# Patient Record
Sex: FEMALE | Race: ASIAN | NOT HISPANIC OR LATINO | Employment: OTHER | URBAN - METROPOLITAN AREA
[De-identification: names, ages, dates, MRNs, and addresses within clinical notes are randomized per-mention and may not be internally consistent; named-entity substitution may affect disease eponyms.]

---

## 2021-03-12 ENCOUNTER — TRANSFERRED RECORDS (OUTPATIENT)
Dept: HEALTH INFORMATION MANAGEMENT | Facility: CLINIC | Age: 61
End: 2021-03-12

## 2021-03-13 ENCOUNTER — TRANSFERRED RECORDS (OUTPATIENT)
Dept: HEALTH INFORMATION MANAGEMENT | Facility: CLINIC | Age: 61
End: 2021-03-13

## 2022-04-14 ENCOUNTER — TRANSFERRED RECORDS (OUTPATIENT)
Dept: HEALTH INFORMATION MANAGEMENT | Facility: CLINIC | Age: 62
End: 2022-04-14

## 2024-07-24 NOTE — TELEPHONE ENCOUNTER
Action 7/24/24   Fax #[phone]   Action Taken Requested NM stress 4-28-22    Resolved images in PACS 7/25    EKGs from  scanned into Epic

## 2024-07-30 ENCOUNTER — OFFICE VISIT (OUTPATIENT)
Dept: CARDIOLOGY | Facility: CLINIC | Age: 64
End: 2024-07-30
Attending: INTERNAL MEDICINE
Payer: MEDICARE

## 2024-07-30 ENCOUNTER — PRE VISIT (OUTPATIENT)
Dept: CARDIOLOGY | Facility: CLINIC | Age: 64
End: 2024-07-30
Payer: MEDICARE

## 2024-07-30 VITALS
OXYGEN SATURATION: 97 % | SYSTOLIC BLOOD PRESSURE: 133 MMHG | DIASTOLIC BLOOD PRESSURE: 84 MMHG | WEIGHT: 105 LBS | HEART RATE: 53 BPM

## 2024-07-30 DIAGNOSIS — E78.49 OTHER HYPERLIPIDEMIA: ICD-10-CM

## 2024-07-30 DIAGNOSIS — I47.10 SVT (SUPRAVENTRICULAR TACHYCARDIA) (H): Primary | ICD-10-CM

## 2024-07-30 DIAGNOSIS — Z82.49 FAMILY HISTORY OF CORONARY ARTERY DISEASE: ICD-10-CM

## 2024-07-30 PROCEDURE — 93010 ELECTROCARDIOGRAM REPORT: CPT | Mod: GC | Performed by: INTERNAL MEDICINE

## 2024-07-30 PROCEDURE — 93005 ELECTROCARDIOGRAM TRACING: CPT | Performed by: INTERNAL MEDICINE

## 2024-07-30 PROCEDURE — G0463 HOSPITAL OUTPT CLINIC VISIT: HCPCS | Performed by: INTERNAL MEDICINE

## 2024-07-30 PROCEDURE — 99205 OFFICE O/P NEW HI 60 MIN: CPT | Performed by: INTERNAL MEDICINE

## 2024-07-30 RX ORDER — ASPIRIN 81 MG/1
81 TABLET ORAL DAILY
COMMUNITY

## 2024-07-30 RX ORDER — AMOXICILLIN 500 MG
1200 CAPSULE ORAL DAILY
COMMUNITY

## 2024-07-30 RX ORDER — ATORVASTATIN CALCIUM 20 MG/1
20 TABLET, FILM COATED ORAL DAILY
COMMUNITY
Start: 2024-07-08

## 2024-07-30 ASSESSMENT — PAIN SCALES - GENERAL: PAINLEVEL: NO PAIN (0)

## 2024-07-30 NOTE — NURSING NOTE
Chief Complaint   Patient presents with    New Patient     New Cardiology     Vitals were taken, medications reconciled, and EKG was performed.    Feliz De La Rosa, ANDIE  12:08 PM

## 2024-07-30 NOTE — PROGRESS NOTES
"HPI:   Alina Karimi is a 64-year-old woman with past history of paroxysmal supraventricular tachycardia who underwent apparently successful ablation of that arrhythmia in 2022.  She is here today in the clinic with her daughter due to recent death of brother due to coronary artery disease.  Patient's father also has a history of coronary artery disease and passed away 7 years ago.  Her mother similarly passed away a number of years ago but for other reasons.    Patient is concerned about her risk of coronary artery disease given the family history and she is here for that purpose.  She has no cardiac symptoms.  Her daughter acted as an .  Patient denies any excessive breathlessness, chest heaviness or tightness, palpitations or heart failure related symptoms.    Alina is a non-smoker and does not consume much in the way of alcohol.    Alina does have complaints of \"night sweats\" of uncertain etiology.  She was worried that these may be indicative of cardiac issues since she had similar problems prior to her ablation in 2022.  The night sweats are unassociated with any typical cardiac complaints.  The etiology is currently unknown.    Based on presumably a evaluation of her lipid profile in the past she is currently being treated with atorvastatin and is also taking aspirin daily.    At today's visit I reviewed her review cardiac symptom status in detail and there were no positive findings.  Nonetheless given her concern we will arrange for coronary artery disease screening and follow-up by video visit.    PAST MEDICAL HISTORY:  No past medical history on file.    CURRENT MEDICATIONS:  Current Outpatient Medications   Medication Sig Dispense Refill    aspirin 81 MG EC tablet Take 81 mg by mouth daily      atorvastatin (LIPITOR) 20 MG tablet Take 20 mg by mouth daily      Omega-3 Fatty Acids (FISH OIL) 1200 MG capsule Take 1,200 mg by mouth daily         PAST SURGICAL HISTORY:  No past surgical history on " "file.    ALLERGIES:   No Known Allergies    FAMILY HISTORY:  Pertinent family history is as indicated in HPI    SOCIAL HISTORY:  Social History     Tobacco Use    Smoking status: Never    Smokeless tobacco: Never       ROS:   Constitutional: No fever, chills, or sweats. Weight stable.   ENT: No visual disturbance, ear ache, epistaxis, sore throat.   Cardiovascular: As per HPI.   Respiratory: No cough, hemoptysis.    GI: No nausea, vomiting,   : No hematuria.   Integument: Negative.   Psychiatric: Negative.   Hematologic: , no easy bleeding.  Neuro: Negative.   Endocrinology: No significant heat or cold intolerance   Musculoskeletal: No myalgia.    Exam:  /84 (BP Location: Right arm, Patient Position: Chair, Cuff Size: Adult Regular)   Pulse 53   Wt 47.6 kg (105 lb)   SpO2 97%   GENERAL APPEARANCE: healthy, alert and no distress  HEENT: no icterus, no xanthelasmas, normal pupil size , normal palate, mucosa moist, no central cyanosis  NECK: no adenopathy, no asymmetry, masses, or scars,   RESPIRATORY: lungs clear to auscultation - no rales, rhonchi or wheezes, no use of accessory muscles, no retractions, respirations are unlabored, normal respiratory rate  CARDIOVASCULAR: regular rhythm, normal S1 with physiologic split S2, no S3 or S4 and no murmur, click or rub, precordium quiet with normal PMI.  ABDOMEN: soft, non tender, bowel sounds normal, aorta not enlarged by palpation, no abdominal bruits  EXTREMITIES: peripheral pulses normal, no edema, no bruits  NEURO: alert and oriented to person/place/time, normal speech,and affect  SKIN: no ecchymoses, no rashes    Labs:  CBC RESULTS:   No results found for: \"WBC\", \"RBC\", \"HGB\", \"HCT\", \"MCV\", \"MCH\", \"MCHC\", \"RDW\", \"PLT\"    BMP RESULTS:  No results found for: \"NA\", \"POTASSIUM\", \"CHLORIDE\", \"CO2\", \"ANIONGAP\", \"GLC\", \"BUN\", \"CR\", \"GFRESTIMATED\", \"GFRESTBLACK\", \"ABDOULAYE\"     INR RESULTS:  No results found for: \"INR\"    Procedures:  PULMONARY FUNCTION TESTS:        No " data to display                  ECHOCARDIOGRAM:   No results found for this or any previous visit (from the past 8760 hour(s)).      Assessment and Plan:   1.  Patient concerned about risk of coronary artery disease given strong family history  2.  Night sweats of uncertain etiology    Plan  1.  Lexiscan  2.  Echocardiogram   3.   Laboratory studies including: ESR, CRP, LUCIANA, BNP, BMP, lipid profile, thyroid indices  4.  Video follow-up after the above laboratory values are available for discussion with patient and daughter    Total elapsed time today with chart review, clinic visit and documentation 60 minutes    I very much appreciated the opportunity to see and assess Alina Karimi in the clinic       Howard Esquivel MD  Cardiac Arrhythmia Service  [facility]  [phone]       CC  Pa Jef Gonzalez MD

## 2024-07-30 NOTE — LETTER
"7/30/2024      RE: Alina Karimi  [address] [place] [address]       Dear Colleague,    Thank you for the opportunity to participate in the care of your patient, Alina Karimi, at the [facility] [place] [facility]. Please see a copy of my visit note below.    HPI:   Alina Karimi is a 64-year-old woman with past history of paroxysmal supraventricular tachycardia who underwent apparently successful ablation of that arrhythmia in 2022.  She is here today in the clinic with her daughter due to recent death of brother due to coronary artery disease.  Patient's father also has a history of coronary artery disease and passed away 7 years ago.  Her mother similarly passed away a number of years ago but for other reasons.    Patient is concerned about her risk of coronary artery disease given the family history and she is here for that purpose.  She has no cardiac symptoms.  Her daughter acted as an .  Patient denies any excessive breathlessness, chest heaviness or tightness, palpitations or heart failure related symptoms.    Alina is a non-smoker and does not consume much in the way of alcohol.    Alina does have complaints of \"night sweats\" of uncertain etiology.  She was worried that these may be indicative of cardiac issues since she had similar problems prior to her ablation in 2022.  The night sweats are unassociated with any typical cardiac complaints.  The etiology is currently unknown.    Based on presumably a evaluation of her lipid profile in the past she is currently being treated with atorvastatin and is also taking aspirin daily.    At today's visit I reviewed her review cardiac symptom status in detail and there were no positive findings.  Nonetheless given her concern we will arrange for coronary artery disease screening and follow-up by video visit.    PAST MEDICAL HISTORY:  No past medical history on file.    CURRENT " MEDICATIONS:  Current Outpatient Medications   Medication Sig Dispense Refill     aspirin 81 MG EC tablet Take 81 mg by mouth daily       atorvastatin (LIPITOR) 20 MG tablet Take 20 mg by mouth daily       Omega-3 Fatty Acids (FISH OIL) 1200 MG capsule Take 1,200 mg by mouth daily         PAST SURGICAL HISTORY:  No past surgical history on file.    ALLERGIES:   No Known Allergies    FAMILY HISTORY:  Pertinent family history is as indicated in HPI    SOCIAL HISTORY:  Social History     Tobacco Use     Smoking status: Never     Smokeless tobacco: Never       ROS:   Constitutional: No fever, chills, or sweats. Weight stable.   ENT: No visual disturbance, ear ache, epistaxis, sore throat.   Cardiovascular: As per HPI.   Respiratory: No cough, hemoptysis.    GI: No nausea, vomiting,   : No hematuria.   Integument: Negative.   Psychiatric: Negative.   Hematologic: , no easy bleeding.  Neuro: Negative.   Endocrinology: No significant heat or cold intolerance   Musculoskeletal: No myalgia.    Exam:  /84 (BP Location: Right arm, Patient Position: Chair, Cuff Size: Adult Regular)   Pulse 53   Wt 47.6 kg (105 lb)   SpO2 97%   GENERAL APPEARANCE: healthy, alert and no distress  HEENT: no icterus, no xanthelasmas, normal pupil size , normal palate, mucosa moist, no central cyanosis  NECK: no adenopathy, no asymmetry, masses, or scars,   RESPIRATORY: lungs clear to auscultation - no rales, rhonchi or wheezes, no use of accessory muscles, no retractions, respirations are unlabored, normal respiratory rate  CARDIOVASCULAR: regular rhythm, normal S1 with physiologic split S2, no S3 or S4 and no murmur, click or rub, precordium quiet with normal PMI.  ABDOMEN: soft, non tender, bowel sounds normal, aorta not enlarged by palpation, no abdominal bruits  EXTREMITIES: peripheral pulses normal, no edema, no bruits  NEURO: alert and oriented to person/place/time, normal speech,and affect  SKIN: no ecchymoses, no  "rashes    Labs:  CBC RESULTS:   No results found for: \"WBC\", \"RBC\", \"HGB\", \"HCT\", \"MCV\", \"MCH\", \"MCHC\", \"RDW\", \"PLT\"    BMP RESULTS:  No results found for: \"NA\", \"POTASSIUM\", \"CHLORIDE\", \"CO2\", \"ANIONGAP\", \"GLC\", \"BUN\", \"CR\", \"GFRESTIMATED\", \"GFRESTBLACK\", \"ABDOULAYE\"     INR RESULTS:  No results found for: \"INR\"    Procedures:  PULMONARY FUNCTION TESTS:        No data to display                  ECHOCARDIOGRAM:   No results found for this or any previous visit (from the past 8760 hour(s)).      Assessment and Plan:   1.  Patient concerned about risk of coronary artery disease given strong family history  2.  Night sweats of uncertain etiology    Plan  1.  Lexiscan  2.  Echocardiogram   3.   Laboratory studies including: ESR, CRP, LUCIANA, BNP, BMP, lipid profile, thyroid indices  4.  Video follow-up after the above laboratory values are available for discussion with patient and daughter    Total elapsed time today with chart review, clinic visit and documentation 60 minutes    I very much appreciated the opportunity to see and assess Alina Karimi in the clinic       Howard Esquivel MD  Cardiac Arrhythmia Service  [facility]  [phone]       CC  Pa Jef Gonzalez MD      Please do not hesitate to contact me if you have any questions/concerns.     Sincerely,     Howard Esquivel MD  "

## 2024-07-30 NOTE — PATIENT INSTRUCTIONS
You were seen in the Electrophysiology Clinic today by: Dr Esquivel    Plan:     Labs/Tests Needed:  Echocardiogram  Lexiscan  Labs    Follow up Visit:  1-2 months or after testing         If you have further questions, please utilize [employer] to contact us.     Your Care Team:    EP Cardiology   Telephone Number     Nurse Line  Talita Gamble, RN   Seda Mcgregor, RN  Jimenez Crockett, LAISHA   [phone]     For scheduling appointments:   Sharonda   [phone]   For procedure scheduling:    Ivanna Wilson     [phone]   For the Device Clinic (Pacemakers, ICDs, Loop Recorders)    During business hours: [phone]  After business hours:   [phone]- select option 4 and ask for job code [id number].       On-call cardiologist for after hours or on weekends:   [phone], option #4, and ask to speak to the on-call cardiologist.     Cardiovascular Clinic:   [address] [place] [address]. [place] [address]      As always, Thank you for trusting us with your health care needs!

## 2024-08-01 LAB
ATRIAL RATE - MUSE: 57 BPM
DIASTOLIC BLOOD PRESSURE - MUSE: NORMAL MMHG
INTERPRETATION ECG - MUSE: NORMAL
P AXIS - MUSE: 46 DEGREES
PR INTERVAL - MUSE: 158 MS
QRS DURATION - MUSE: 90 MS
QT - MUSE: 454 MS
QTC - MUSE: 441 MS
R AXIS - MUSE: 68 DEGREES
SYSTOLIC BLOOD PRESSURE - MUSE: NORMAL MMHG
T AXIS - MUSE: 38 DEGREES
VENTRICULAR RATE- MUSE: 57 BPM

## 2024-08-07 ENCOUNTER — HOSPITAL ENCOUNTER (OUTPATIENT)
Dept: NUCLEAR MEDICINE | Facility: CLINIC | Age: 64
Setting detail: NUCLEAR MEDICINE
Discharge: HOME | End: 2024-08-07
Attending: INTERNAL MEDICINE
Payer: MEDICARE

## 2024-08-07 ENCOUNTER — HOSPITAL ENCOUNTER (OUTPATIENT)
Dept: CARDIOLOGY | Facility: CLINIC | Age: 64
Discharge: HOME | End: 2024-08-07
Attending: INTERNAL MEDICINE
Payer: MEDICARE

## 2024-08-07 ENCOUNTER — APPOINTMENT (OUTPATIENT)
Dept: LAB | Facility: CLINIC | Age: 64
End: 2024-08-07
Attending: INTERNAL MEDICINE
Payer: MEDICARE

## 2024-08-07 DIAGNOSIS — Z82.49 FAMILY HISTORY OF CORONARY ARTERY DISEASE: ICD-10-CM

## 2024-08-07 DIAGNOSIS — I47.10 SVT (SUPRAVENTRICULAR TACHYCARDIA) (H): ICD-10-CM

## 2024-08-07 DIAGNOSIS — E78.49 OTHER HYPERLIPIDEMIA: ICD-10-CM

## 2024-08-07 LAB
ANION GAP SERPL CALCULATED.3IONS-SCNC: 8 MMOL/L (ref 7–15)
BUN SERPL-MCNC: 16 MG/DL (ref 8–23)
CALCIUM SERPL-MCNC: 8.8 MG/DL (ref 8.8–10.4)
CHLORIDE SERPL-SCNC: 108 MMOL/L (ref 98–107)
CHOLEST SERPL-MCNC: 193 MG/DL
CREAT SERPL-MCNC: 0.53 MG/DL (ref 0.51–0.95)
CRP SERPL-MCNC: <3 MG/L
CV STRESS MAX HR HE: 85
EGFRCR SERPLBLD CKD-EPI 2021: >90 ML/MIN/1.73M2
ERYTHROCYTE [DISTWIDTH] IN BLOOD BY AUTOMATED COUNT: 13.1 % (ref 10–15)
ERYTHROCYTE [SEDIMENTATION RATE] IN BLOOD BY WESTERGREN METHOD: 6 MM/HR (ref 0–30)
FASTING STATUS PATIENT QL REPORTED: YES
FASTING STATUS PATIENT QL REPORTED: YES
GLUCOSE SERPL-MCNC: 100 MG/DL (ref 70–99)
HCO3 SERPL-SCNC: 25 MMOL/L (ref 22–29)
HCT VFR BLD AUTO: 39.2 % (ref 35–47)
HDLC SERPL-MCNC: 51 MG/DL
HGB BLD-MCNC: 13 G/DL (ref 11.7–15.7)
LDLC SERPL CALC-MCNC: 122 MG/DL
LVEF ECHO: NORMAL
MCH RBC QN AUTO: 31.3 PG (ref 26.5–33)
MCHC RBC AUTO-ENTMCNC: 33.2 G/DL (ref 31.5–36.5)
MCV RBC AUTO: 94 FL (ref 78–100)
NONHDLC SERPL-MCNC: 142 MG/DL
PLATELET # BLD AUTO: 181 10E3/UL (ref 150–450)
POTASSIUM SERPL-SCNC: 3.9 MMOL/L (ref 3.4–5.3)
RATE PRESSURE PRODUCT: 8160
RBC # BLD AUTO: 4.16 10E6/UL (ref 3.8–5.2)
SODIUM SERPL-SCNC: 141 MMOL/L (ref 135–145)
STRESS ECHO BASELINE DIASTOLIC HE: 65
STRESS ECHO BASELINE HR: 54 BPM
STRESS ECHO BASELINE SYSTOLIC BP: 119
STRESS ECHO CALCULATED PERCENT HR: 55 %
STRESS ECHO LAST STRESS DIASTOLIC BP: 63
STRESS ECHO LAST STRESS SYSTOLIC BP: 96
STRESS ECHO TARGET HR: 155
TRIGL SERPL-MCNC: 102 MG/DL
TSH SERPL DL<=0.005 MIU/L-ACNC: 0.96 UIU/ML (ref 0.3–4.2)
WBC # BLD AUTO: 5 10E3/UL (ref 4–11)

## 2024-08-07 PROCEDURE — 93017 CV STRESS TEST TRACING ONLY: CPT

## 2024-08-07 PROCEDURE — 84443 ASSAY THYROID STIM HORMONE: CPT | Performed by: INTERNAL MEDICINE

## 2024-08-07 PROCEDURE — G1010 CDSM STANSON: HCPCS | Performed by: INTERNAL MEDICINE

## 2024-08-07 PROCEDURE — 80061 LIPID PANEL: CPT | Performed by: INTERNAL MEDICINE

## 2024-08-07 PROCEDURE — 255N000002 HC RX 255 OP 636: Performed by: INTERNAL MEDICINE

## 2024-08-07 PROCEDURE — 93016 CV STRESS TEST SUPVJ ONLY: CPT | Performed by: INTERNAL MEDICINE

## 2024-08-07 PROCEDURE — 86140 C-REACTIVE PROTEIN: CPT | Performed by: INTERNAL MEDICINE

## 2024-08-07 PROCEDURE — 93306 TTE W/DOPPLER COMPLETE: CPT | Mod: 26 | Performed by: INTERNAL MEDICINE

## 2024-08-07 PROCEDURE — 85027 COMPLETE CBC AUTOMATED: CPT | Performed by: INTERNAL MEDICINE

## 2024-08-07 PROCEDURE — C8929 TTE W OR WO FOL WCON,DOPPLER: HCPCS

## 2024-08-07 PROCEDURE — 80048 BASIC METABOLIC PNL TOTAL CA: CPT | Performed by: INTERNAL MEDICINE

## 2024-08-07 PROCEDURE — 78452 HT MUSCLE IMAGE SPECT MULT: CPT | Mod: ME

## 2024-08-07 PROCEDURE — 85652 RBC SED RATE AUTOMATED: CPT | Performed by: INTERNAL MEDICINE

## 2024-08-07 PROCEDURE — A9502 TC99M TETROFOSMIN: HCPCS | Performed by: INTERNAL MEDICINE

## 2024-08-07 PROCEDURE — 36415 COLL VENOUS BLD VENIPUNCTURE: CPT | Performed by: INTERNAL MEDICINE

## 2024-08-07 PROCEDURE — 999N000208 ECHOCARDIOGRAM COMPLETE

## 2024-08-07 PROCEDURE — 250N000011 HC RX IP 250 OP 636: Performed by: INTERNAL MEDICINE

## 2024-08-07 PROCEDURE — 343N000001 HC RX 343: Performed by: INTERNAL MEDICINE

## 2024-08-07 PROCEDURE — 83520 IMMUNOASSAY QUANT NOS NONAB: CPT | Performed by: INTERNAL MEDICINE

## 2024-08-07 PROCEDURE — 86038 ANTINUCLEAR ANTIBODIES: CPT | Performed by: INTERNAL MEDICINE

## 2024-08-07 PROCEDURE — 78452 HT MUSCLE IMAGE SPECT MULT: CPT | Mod: 26 | Performed by: INTERNAL MEDICINE

## 2024-08-07 PROCEDURE — 93018 CV STRESS TEST I&R ONLY: CPT | Performed by: INTERNAL MEDICINE

## 2024-08-07 RX ORDER — REGADENOSON 0.08 MG/ML
0.4 INJECTION, SOLUTION INTRAVENOUS ONCE
Status: COMPLETED | OUTPATIENT
Start: 2024-08-07 | End: 2024-08-07

## 2024-08-07 RX ORDER — AMINOPHYLLINE 25 MG/ML
50-100 INJECTION, SOLUTION INTRAVENOUS
Status: DISCONTINUED | OUTPATIENT
Start: 2024-08-07 | End: 2024-08-08 | Stop reason: HOSPADM

## 2024-08-07 RX ORDER — LIDOCAINE 40 MG/G
CREAM TOPICAL
Status: DISCONTINUED | OUTPATIENT
Start: 2024-08-07 | End: 2024-08-08 | Stop reason: HOSPADM

## 2024-08-07 RX ORDER — ALBUTEROL SULFATE 90 UG/1
2 AEROSOL, METERED RESPIRATORY (INHALATION) EVERY 5 MIN PRN
Status: DISCONTINUED | OUTPATIENT
Start: 2024-08-07 | End: 2024-08-08 | Stop reason: HOSPADM

## 2024-08-07 RX ADMIN — TETROFOSMIN 10.6 MILLICURIE: 1.38 INJECTION, POWDER, LYOPHILIZED, FOR SOLUTION INTRAVENOUS at 09:45

## 2024-08-07 RX ADMIN — REGADENOSON 0.4 MG: 0.08 INJECTION, SOLUTION INTRAVENOUS at 10:55

## 2024-08-07 RX ADMIN — TETROFOSMIN 40.4 MILLICURIE: 1.38 INJECTION, POWDER, LYOPHILIZED, FOR SOLUTION INTRAVENOUS at 10:57

## 2024-08-07 RX ADMIN — HUMAN ALBUMIN MICROSPHERES AND PERFLUTREN 5 ML: 10; .22 INJECTION, SOLUTION INTRAVENOUS at 13:25

## 2024-08-07 NOTE — PROGRESS NOTES
Pt here for Lexiscan nuclear stress test. Medication and side effects reviewed with patient. Lung sounds clear to auscultation bilaterally. Denied caffeine use. Patient tolerated Lexiscan dose without any adverse reactions. VSS. Monitored post injection and then taken to the [facility] waiting room and instructed to wait there for nuclear medicine tech for follow up imaging.

## 2024-08-08 LAB — ANA SER QL IF: NEGATIVE

## 2024-08-09 LAB — TRYPTASE SERPL-MCNC: 4 UG/L

## 2024-09-01 ENCOUNTER — HEALTH MAINTENANCE LETTER (OUTPATIENT)
Age: 64
End: 2024-09-01

## 2024-10-09 ENCOUNTER — OFFICE VISIT (OUTPATIENT)
Dept: CARDIOLOGY | Facility: CLINIC | Age: 64
End: 2024-10-09
Attending: NURSE PRACTITIONER
Payer: MEDICARE

## 2024-10-09 VITALS
DIASTOLIC BLOOD PRESSURE: 73 MMHG | OXYGEN SATURATION: 97 % | WEIGHT: 107 LBS | HEART RATE: 60 BPM | SYSTOLIC BLOOD PRESSURE: 120 MMHG

## 2024-10-09 DIAGNOSIS — Z82.49 FAMILY HISTORY OF CORONARY ARTERY DISEASE: ICD-10-CM

## 2024-10-09 DIAGNOSIS — E78.49 OTHER HYPERLIPIDEMIA: ICD-10-CM

## 2024-10-09 DIAGNOSIS — I47.10 SVT (SUPRAVENTRICULAR TACHYCARDIA) (H): ICD-10-CM

## 2024-10-09 DIAGNOSIS — I77.810 ASCENDING AORTA DILATION (H): Primary | ICD-10-CM

## 2024-10-09 PROCEDURE — G0463 HOSPITAL OUTPT CLINIC VISIT: HCPCS | Performed by: NURSE PRACTITIONER

## 2024-10-09 PROCEDURE — 99213 OFFICE O/P EST LOW 20 MIN: CPT | Performed by: NURSE PRACTITIONER

## 2024-10-09 RX ORDER — ISONIAZID 300 MG/1
TABLET ORAL
COMMUNITY
Start: 2024-09-13

## 2024-10-09 RX ORDER — RIFAMPIN 300 MG/1
CAPSULE ORAL
COMMUNITY
Start: 2024-09-13

## 2024-10-09 ASSESSMENT — PAIN SCALES - GENERAL: PAINLEVEL: NO PAIN (0)

## 2024-10-09 NOTE — NURSING NOTE
Chief Complaint   Patient presents with    Follow Up     2mo follow-up to review results from echo, isac, labs that Dr Esquivel ordered r/t patient concerns about CAD with strong fam hx. Hx SVT.       Vitals were taken, medications reconciled.     Glen Colvin, Clinic Assistant    3:11 PM

## 2024-10-09 NOTE — PROGRESS NOTES
"    ELECTROPHYSIOLOGY CLINIC VISIT    Assessment/Recommendations   Assessment/Plan:    Ms. Karimi is a 64 year old female who has a last medical history significant for PSVT s/p ablation 2022.    We reviewed her recent cardiac testing today. This showed normal heart function and dilated aorta. We will get a CTA for aorta eval given echo reported dilated. NM stress test is negative for ischemia or infarction. ECG is stable. She mentions that she is very worried about her family history and we offered that she could be referred to [facility] for further surveillance.     Follow up with EP on an as needed basis.        History of Present Illness/Subjective    Ms. Alina Karimi is a 64 year old female who comes in today for EP follow-up of cardiac result review.    Ms. Karimi is a 64 year old female who has a last medical history significant for PSVT s/p ablation 2022.    EP Visit 7/30/24: She is here today in the clinic with her daughter due to recent death of brother due to coronary artery disease.  Patient's father also has a history of coronary artery disease and passed away 7 years ago.  Her mother similarly passed away a number of years ago but for other reasons. Patient is concerned about her risk of coronary artery disease given the family history and she is here for that purpose.  She has no cardiac symptoms.  Her daughter acted as an .  Patient denies any excessive breathlessness, chest heaviness or tightness, palpitations or heart failure related symptoms. Alina is a non-smoker and does not consume much in the way of alcohol. Alina does have complaints of \"night sweats\" of uncertain etiology.  She was worried that these may be indicative of cardiac issues since she had similar problems prior to her ablation in 2022.  The night sweats are unassociated with any typical cardiac complaints.  The etiology is currently unknown. Based on presumably a evaluation of her lipid profile in the past " she is currently being treated with atorvastatin and is also taking aspirin daily. At today's visit I reviewed her review cardiac symptom status in detail and there were no positive findings.  Nonetheless given her concern we will arrange for coronary artery disease screening and follow-up by video visit.       She presents today for follow up. She reports feeling well. She denies chest discomfort, palpitations, abdominal fullness/bloating or peripheral edema, shortness of breath, paroxysmal nocturnal dyspnea, orthopnea, lightheadedness, dizziness, pre-syncope, or syncope. An echo from 8/7/24 showed LVEF 60-65%, normal RV function, mild AI, and dilated aorta 4.1 cm (severely dilated d/t BSA). A NM stress test from 8/7/24 showed no ischemia or infarction and LVEF hyperdynamic. Current cardiac medications include: Lipitor and ASA.       I have reviewed and updated the patient's Past Medical History, Social History, Family History and Medication List.     Cardiographics (Personally Reviewed) :   8/7/24 Echo:   Interpretation Summary  Severely dilated ascending aorta when indexed to patient's body surface area  measuring 4.1 cm (3.1 cm/m2.) Recommend CTA or MRA of the aorta to better  assess aortic dimensions.     Left ventricular function is normal.The ejection fraction is 60-65%.  Global right ventricular function is normal.  Mild aortic insufficiency is present.  The right ventricular systolic pressure is 15mmHg above the right atrial  pressure.  There is no prior study for direct comparison.    8/7/24 NM Stress Test:   Result Text       The nuclear stress test is negative for inducible myocardial ischemia or infarction.    Hyperdynamic LV function with LV EF over 75%.    There is no prior study for comparison.         Physical Examination   /73 (BP Location: Right arm, Patient Position: Chair, Cuff Size: Adult Small)   Pulse 60   Wt 48.5 kg (107 lb)   SpO2 97%   Wt Readings from Last 3 Encounters:    10/09/24 48.5 kg (107 lb)   07/30/24 47.6 kg (105 lb)     General Appearance:   Alert, well-appearing and in no acute distress.   HEENT: Atraumatic, normocephalic. PERRL.  MMM.   Chest/Lungs:   Respirations unlabored.  Lungs are clear to auscultation.   Cardiovascular:   Regular rate and rhythm.  S1/S2. No murmur.    Abdomen:  Soft, nontender, nondistended.   Extremities: No cyanosis or clubbing. No edema.    Musculoskeletal: Moves all extremities.  Gait normal.   Skin: Warm, dry, intact.    Neurologic: Mood and affect are appropriate.  Alert and oriented to person, place, time, and situation.          Medications  Allergies   Current Outpatient Medications   Medication Sig Dispense Refill    aspirin 81 MG EC tablet Take 81 mg by mouth daily      atorvastatin (LIPITOR) 20 MG tablet Take 20 mg by mouth daily      isoniazid (NYDRAZID) 300 MG tablet TAKE ONE Tablet (300mg) BY MOUTH EVERY DAY      Omega-3 Fatty Acids (FISH OIL) 1200 MG capsule Take 1,200 mg by mouth daily      rifampin (RIFADIN) 300 MG capsule TAKE TWO CAPSULES (600mg) BY MOUTH EVERY DAY      No Known Allergies      Lab Results (Personally Reviewed)    Chemistry/lipid CBC Cardiac Enzymes/BNP/TSH/INR   Lab Results   Component Value Date    BUN 16.0 08/07/2024     08/07/2024    CO2 25 08/07/2024     Creatinine   Date Value Ref Range Status   08/07/2024 0.53 0.51 - 0.95 mg/dL Final       Lab Results   Component Value Date    CHOL 193 08/07/2024    HDL 51 08/07/2024     (H) 08/07/2024      Lab Results   Component Value Date    WBC 5.0 08/07/2024    HGB 13.0 08/07/2024    HCT 39.2 08/07/2024    MCV 94 08/07/2024     08/07/2024    Lab Results   Component Value Date    TSH 0.96 08/07/2024        The patient states understanding and is agreeable with the plan.   JOSE ALEJANDRO Keating CNP  Electrophysiology Consult Service  Securely message with [employer]   Text page via [facility] Paging/[employer]

## 2024-10-09 NOTE — LETTER
10/9/2024      RE: Alina Karimi  [address] [place] [address]       Dear Colleague,    Thank you for the opportunity to participate in the care of your patient, Alina Karimi, at the [facility] [place] [facility]. Please see a copy of my visit note below.        ELECTROPHYSIOLOGY CLINIC VISIT    Assessment/Recommendations   Assessment/Plan:    Ms. Karimi is a 64 year old female who has a last medical history significant for PSVT s/p ablation 2022.    We reviewed her recent cardiac testing today. This showed normal heart function and dilated aorta. We will get a CTA for aorta eval given echo reported dilated. NM stress test is negative for ischemia or infarction. ECG is stable. She mentions that she is very worried about her family history and we offered that she could be referred to [facility] for further surveillance.     Follow up with EP on an as needed basis.        History of Present Illness/Subjective    Ms. Alina Karimi is a 64 year old female who comes in today for EP follow-up of cardiac result review.    Ms. Karimi is a 64 year old female who has a last medical history significant for PSVT s/p ablation 2022.    EP Visit 7/30/24: She is here today in the clinic with her daughter due to recent death of brother due to coronary artery disease.  Patient's father also has a history of coronary artery disease and passed away 7 years ago.  Her mother similarly passed away a number of years ago but for other reasons. Patient is concerned about her risk of coronary artery disease given the family history and she is here for that purpose.  She has no cardiac symptoms.  Her daughter acted as an .  Patient denies any excessive breathlessness, chest heaviness or tightness, palpitations or heart failure related symptoms. Alina is a non-smoker and does not consume much in the way of alcohol. Alina does have complaints of  "\"night sweats\" of uncertain etiology.  She was worried that these may be indicative of cardiac issues since she had similar problems prior to her ablation in 2022.  The night sweats are unassociated with any typical cardiac complaints.  The etiology is currently unknown. Based on presumably a evaluation of her lipid profile in the past she is currently being treated with atorvastatin and is also taking aspirin daily. At today's visit I reviewed her review cardiac symptom status in detail and there were no positive findings.  Nonetheless given her concern we will arrange for coronary artery disease screening and follow-up by video visit.       She presents today for follow up. She reports feeling well. She denies chest discomfort, palpitations, abdominal fullness/bloating or peripheral edema, shortness of breath, paroxysmal nocturnal dyspnea, orthopnea, lightheadedness, dizziness, pre-syncope, or syncope. An echo from 8/7/24 showed LVEF 60-65%, normal RV function, mild AI, and dilated aorta 4.1 cm (severely dilated d/t BSA). A NM stress test from 8/7/24 showed no ischemia or infarction and LVEF hyperdynamic. Current cardiac medications include: Lipitor and ASA.       I have reviewed and updated the patient's Past Medical History, Social History, Family History and Medication List.     Cardiographics (Personally Reviewed) :   8/7/24 Echo:   Interpretation Summary  Severely dilated ascending aorta when indexed to patient's body surface area  measuring 4.1 cm (3.1 cm/m2.) Recommend CTA or MRA of the aorta to better  assess aortic dimensions.     Left ventricular function is normal.The ejection fraction is 60-65%.  Global right ventricular function is normal.  Mild aortic insufficiency is present.  The right ventricular systolic pressure is 15mmHg above the right atrial  pressure.  There is no prior study for direct comparison.    8/7/24 NM Stress Test:   Result Text        The nuclear stress test is negative for " inducible myocardial ischemia or infarction.     Hyperdynamic LV function with LV EF over 75%.     There is no prior study for comparison.         Physical Examination   /73 (BP Location: Right arm, Patient Position: Chair, Cuff Size: Adult Small)   Pulse 60   Wt 48.5 kg (107 lb)   SpO2 97%   Wt Readings from Last 3 Encounters:   10/09/24 48.5 kg (107 lb)   07/30/24 47.6 kg (105 lb)     General Appearance:   Alert, well-appearing and in no acute distress.   HEENT: Atraumatic, normocephalic. PERRL.  MMM.   Chest/Lungs:   Respirations unlabored.  Lungs are clear to auscultation.   Cardiovascular:   Regular rate and rhythm.  S1/S2. No murmur.    Abdomen:  Soft, nontender, nondistended.   Extremities: No cyanosis or clubbing. No edema.    Musculoskeletal: Moves all extremities.  Gait normal.   Skin: Warm, dry, intact.    Neurologic: Mood and affect are appropriate.  Alert and oriented to person, place, time, and situation.          Medications  Allergies   Current Outpatient Medications   Medication Sig Dispense Refill     aspirin 81 MG EC tablet Take 81 mg by mouth daily       atorvastatin (LIPITOR) 20 MG tablet Take 20 mg by mouth daily       isoniazid (NYDRAZID) 300 MG tablet TAKE ONE Tablet (300mg) BY MOUTH EVERY DAY       Omega-3 Fatty Acids (FISH OIL) 1200 MG capsule Take 1,200 mg by mouth daily       rifampin (RIFADIN) 300 MG capsule TAKE TWO CAPSULES (600mg) BY MOUTH EVERY DAY      No Known Allergies      Lab Results (Personally Reviewed)    Chemistry/lipid CBC Cardiac Enzymes/BNP/TSH/INR   Lab Results   Component Value Date    BUN 16.0 08/07/2024     08/07/2024    CO2 25 08/07/2024     Creatinine   Date Value Ref Range Status   08/07/2024 0.53 0.51 - 0.95 mg/dL Final       Lab Results   Component Value Date    CHOL 193 08/07/2024    HDL 51 08/07/2024     (H) 08/07/2024      Lab Results   Component Value Date    WBC 5.0 08/07/2024    HGB 13.0 08/07/2024    HCT 39.2 08/07/2024    MCV 94  08/07/2024     08/07/2024    Lab Results   Component Value Date    TSH 0.96 08/07/2024        The patient states understanding and is agreeable with the plan.   JOSE ALEJANDRO Keating CNP  Electrophysiology Consult Service  Securely message with [employer]   Text page via [facility] Paging/Directory                  Please do not hesitate to contact me if you have any questions/concerns.     Sincerely,     JOSE ALEJANDRO Ruiz CNP

## 2024-10-19 ENCOUNTER — ANCILLARY PROCEDURE (OUTPATIENT)
Dept: CT IMAGING | Facility: CLINIC | Age: 64
End: 2024-10-19
Attending: NURSE PRACTITIONER
Payer: MEDICARE

## 2024-10-19 DIAGNOSIS — I77.810 ASCENDING AORTA DILATION (H): ICD-10-CM

## 2024-10-19 PROCEDURE — 71275 CT ANGIOGRAPHY CHEST: CPT | Mod: MG | Performed by: RADIOLOGY

## 2024-10-19 PROCEDURE — G1010 CDSM STANSON: HCPCS | Performed by: RADIOLOGY

## 2024-10-19 RX ORDER — IOPAMIDOL 755 MG/ML
90 INJECTION, SOLUTION INTRAVASCULAR ONCE
Status: COMPLETED | OUTPATIENT
Start: 2024-10-19 | End: 2024-10-19

## 2024-10-19 RX ADMIN — IOPAMIDOL 90 ML: 755 INJECTION, SOLUTION INTRAVASCULAR at 07:19

## 2024-10-19 NOTE — DISCHARGE INSTRUCTIONS

## 2024-11-14 ENCOUNTER — TELEPHONE (OUTPATIENT)
Dept: CARDIOLOGY | Facility: CLINIC | Age: 64
End: 2024-11-14
Payer: MEDICARE

## 2024-11-14 DIAGNOSIS — R73.09 ELEVATED GLUCOSE: ICD-10-CM

## 2024-11-14 DIAGNOSIS — E78.49 OTHER HYPERLIPIDEMIA: ICD-10-CM

## 2024-11-14 DIAGNOSIS — Z82.49 FAMILY HISTORY OF CORONARY ARTERY DISEASE: Primary | ICD-10-CM

## 2024-11-14 NOTE — TELEPHONE ENCOUNTER
11/14 Patient's daughter confirmed scheduled appointment:  Date: 11/21/2024  Time: 2 :00 pm  Visit type: Labs (non fasting)  Provider: Autumn  [place] [facility] [place]/imaging: n/a  Additional notes: n/a

## 2024-11-21 ENCOUNTER — OFFICE VISIT (OUTPATIENT)
Dept: CARDIOLOGY | Facility: CLINIC | Age: 64
End: 2024-11-21
Attending: INTERNAL MEDICINE
Payer: MEDICARE

## 2024-11-21 ENCOUNTER — LAB (OUTPATIENT)
Dept: LAB | Facility: CLINIC | Age: 64
End: 2024-11-21
Payer: MEDICARE

## 2024-11-21 VITALS
OXYGEN SATURATION: 99 % | WEIGHT: 102 LBS | SYSTOLIC BLOOD PRESSURE: 145 MMHG | DIASTOLIC BLOOD PRESSURE: 84 MMHG | HEART RATE: 50 BPM

## 2024-11-21 DIAGNOSIS — Z82.49 FAMILY HISTORY OF CORONARY ARTERY DISEASE: ICD-10-CM

## 2024-11-21 DIAGNOSIS — R73.09 ELEVATED GLUCOSE: ICD-10-CM

## 2024-11-21 DIAGNOSIS — I77.810 ASCENDING AORTA DILATION (H): ICD-10-CM

## 2024-11-21 DIAGNOSIS — E78.49 OTHER HYPERLIPIDEMIA: ICD-10-CM

## 2024-11-21 LAB
ALBUMIN SERPL BCG-MCNC: 4.5 G/DL (ref 3.5–5.2)
ALP SERPL-CCNC: 82 U/L (ref 40–150)
ALT SERPL W P-5'-P-CCNC: 19 U/L (ref 0–50)
ANION GAP SERPL CALCULATED.3IONS-SCNC: 8 MMOL/L (ref 7–15)
APO A-I SERPL-MCNC: >240 MG/DL
AST SERPL W P-5'-P-CCNC: 23 U/L (ref 0–45)
BILIRUB SERPL-MCNC: 0.3 MG/DL
BUN SERPL-MCNC: 12.2 MG/DL (ref 8–23)
CALCIUM SERPL-MCNC: 9.2 MG/DL (ref 8.8–10.4)
CHLORIDE SERPL-SCNC: 107 MMOL/L (ref 98–107)
CREAT SERPL-MCNC: 0.56 MG/DL (ref 0.51–0.95)
CREAT UR-MCNC: 16 MG/DL
EGFRCR SERPLBLD CKD-EPI 2021: >90 ML/MIN/1.73M2
EST. AVERAGE GLUCOSE BLD GHB EST-MCNC: 157 MG/DL
GLUCOSE SERPL-MCNC: 82 MG/DL (ref 70–99)
HBA1C MFR BLD: 7.1 %
HCO3 SERPL-SCNC: 28 MMOL/L (ref 22–29)
MICROALBUMIN UR-MCNC: <12 MG/L
MICROALBUMIN/CREAT UR: NORMAL MG/G{CREAT}
POTASSIUM SERPL-SCNC: 4.2 MMOL/L (ref 3.4–5.3)
PROT SERPL-MCNC: 7.5 G/DL (ref 6.4–8.3)
SODIUM SERPL-SCNC: 143 MMOL/L (ref 135–145)

## 2024-11-21 PROCEDURE — 36415 COLL VENOUS BLD VENIPUNCTURE: CPT | Performed by: PATHOLOGY

## 2024-11-21 PROCEDURE — 99214 OFFICE O/P EST MOD 30 MIN: CPT | Performed by: INTERNAL MEDICINE

## 2024-11-21 PROCEDURE — G0463 HOSPITAL OUTPT CLINIC VISIT: HCPCS | Performed by: INTERNAL MEDICINE

## 2024-11-21 PROCEDURE — 83695 ASSAY OF LIPOPROTEIN(A): CPT | Performed by: INTERNAL MEDICINE

## 2024-11-21 PROCEDURE — 80053 COMPREHEN METABOLIC PANEL: CPT | Performed by: PATHOLOGY

## 2024-11-21 PROCEDURE — 83036 HEMOGLOBIN GLYCOSYLATED A1C: CPT | Performed by: INTERNAL MEDICINE

## 2024-11-21 PROCEDURE — 82043 UR ALBUMIN QUANTITATIVE: CPT | Performed by: INTERNAL MEDICINE

## 2024-11-21 PROCEDURE — 99000 SPECIMEN HANDLING OFFICE-LAB: CPT | Performed by: PATHOLOGY

## 2024-11-21 PROCEDURE — 82570 ASSAY OF URINE CREATININE: CPT | Performed by: INTERNAL MEDICINE

## 2024-11-21 ASSESSMENT — PAIN SCALES - GENERAL: PAINLEVEL_OUTOF10: NO PAIN (0)

## 2024-11-21 NOTE — NURSING NOTE
Chief Complaint   Patient presents with    New Patient     NEW PREVENTATIVE     Vitals were taken and medications reconciled.    Frank Castillo, EMT  3:13 PM

## 2024-11-21 NOTE — LETTER
2024      RE: Alina Karimi  [address] [place] [address]       Dear Colleague,    Thank you for the opportunity to participate in the care of your patient, Alina Karimi, at the [facility]. Please see a copy of my visit note below.    HPI: I had the privilege to  evaluate Mrs Alina Karimi, who is a 64 yr female who comes to be evaluated for a large aortic arch, which was seen on a CT Chest.  Patient denies chest pain, shortness of breath, palpations and intermittent claudication,    PAST MEDICAL HISTORY:    PSVT s/p ablation .     CURRENT MEDICATIONS:  Current Outpatient Medications   Medication Sig Dispense Refill     aspirin 81 MG EC tablet Take 81 mg by mouth daily       atorvastatin (LIPITOR) 20 MG tablet Take 20 mg by mouth daily       isoniazid (NYDRAZID) 300 MG tablet TAKE ONE Tablet (300mg) BY MOUTH EVERY DAY       Omega-3 Fatty Acids (FISH OIL) 1200 MG capsule Take 1,200 mg by mouth daily       rifampin (RIFADIN) 300 MG capsule TAKE TWO CAPSULES (600mg) BY MOUTH EVERY DAY         PAST SURGICAL HISTORY:  No past surgical history on file.    ALLERGIES   No Known Allergies    FAMILY HISTORY:  Mother  - cause unknown  Father: CAD -   Brother  due to CAD      SOCIAL HISTORY:  Social History     Socioeconomic History     Marital status:      Spouse name: None     Number of children: None     Years of education: None     Highest education level: None   Tobacco Use     Smoking status: Never     Smokeless tobacco: Never       ROS:   Constitutional: No fever, chills, or sweats. No weight gain/loss   ENT: No visual disturbance, ear ache, epistaxis, sore throat  Allergies/Immunologic: Negative.   Respiratory: No cough, hemoptysia  Cardiovascular: As per HPI  GI: No nausea, vomiting, hematemesis, melena, or hematochezia  : No urinary frequency, dysuria, or hematuria  Integument:  Negative  Psychiatric: Negative  Neuro: Negative  Endocrinology: Negative   Musculoskeletal: Negative    EXAM:  BP (!) 145/84 (BP Location: Right arm, Patient Position: Chair, Cuff Size: Adult Regular)   Pulse 50   Wt 46.3 kg (102 lb)   SpO2 99%   In general, the patient is a pleasant female in no apparent distress.    HEENT: NC/AT.  PERRLA.  EOMI.  Sclerae white, not injected.  Nares clear.  Pharynx without erythema or exudate.  Dentition intact.    Neck: No adenopathy.  No thyromegaly. Carotids +4/4 bilaterally without bruits.  No jugular venous distension.   Heart: RRR. Normal S1, S2 splits physiologically. No murmur, rub, click, or gallop. The PMI is in the 5th ICS in the midclavicular line. There is no heave.    Lungs: CTA.  No ronchi, wheezes, rales.  No dullness to percussion.   Abdomen: Soft, nontender, nondistended. No organomegaly.  No bruits.   Extremities: No clubbing, cyanosis, or edema.  The pulses are +4/4 at the radial, brachial, femoral, popliteal, DP, and PT sites bilaterally.  No bruits are noted.  Neurologic: Alert and oriented to person/place/time, normal speech, gait and affect  Skin: No petechiae, purpura or rash    BP at the end of the visit:. 128/74 mmHg    Labs:     Latest Reference Range & Units 11/21/24 14:22   Sodium 135 - 145 mmol/L 143   Potassium 3.4 - 5.3 mmol/L 4.2   Chloride 98 - 107 mmol/L 107   Carbon Dioxide (CO2) 22 - 29 mmol/L 28   Urea Nitrogen 8.0 - 23.0 mg/dL 12.2   Creatinine 0.51 - 0.95 mg/dL 0.56   GFR Estimate >60 mL/min/1.73m2 >90   Calcium 8.8 - 10.4 mg/dL 9.2   Anion Gap 7 - 15 mmol/L 8   Albumin 3.5 - 5.2 g/dL 4.5   Protein Total 6.4 - 8.3 g/dL 7.5   Alkaline Phosphatase 40 - 150 U/L 82   ALT 0 - 50 U/L 19   AST 0 - 45 U/L 23   Bilirubin Total <=1.2 mg/dL 0.3   Glucose 70 - 99 mg/dL 82   Estimated Average Glucose <117 mg/dL 157 (H)   Hemoglobin A1C <5.7 % 7.1 (H)   Lipoprotein (a) <30 mg/dL >240 (H)   (H): Data is abnormally high   Latest Reference Range & Units  11/21/24 14:54   Albumin Urine mg/g Cr  See Comment   Albumin Urine mg/L mg/L <12.0   Creatinine Urine mg/dL 16.0       LIPID RESULTS:  Lab Results   Component Value Date    CHOL 193 08/07/2024    HDL 51 08/07/2024     (H) 08/07/2024    TRIG 102 08/07/2024    NHDL 142 (H) 08/07/2024       LIVER ENZYME RESULTS:  Lab Results   Component Value Date    AST 23 11/21/2024    ALT 19 11/21/2024       CBC RESULTS:  Lab Results   Component Value Date    WBC 5.0 08/07/2024    RBC 4.16 08/07/2024    HGB 13.0 08/07/2024    HCT 39.2 08/07/2024    MCV 94 08/07/2024    MCH 31.3 08/07/2024    MCHC 33.2 08/07/2024    RDW 13.1 08/07/2024     08/07/2024       BMP RESULTS:  Lab Results   Component Value Date     11/21/2024    POTASSIUM 4.2 11/21/2024    CHLORIDE 107 11/21/2024    CO2 28 11/21/2024    ANIONGAP 8 11/21/2024    GLC 82 11/21/2024    BUN 12.2 11/21/2024    CR 0.56 11/21/2024    GFRESTIMATED >90 11/21/2024    ABDOULAYE 9.2 11/21/2024        Lpa > 240 mg/dl    Procedures:    EKG: sin rhythm    Echocardiogram  Severely dilated ascending aorta when indexed to patient's body surface area  measuring 4.1 cm (3.1 cm/m2.) Recommend CTA or MRA of the aorta to better  assess aortic dimensions.     Left ventricular function is normal.The ejection fraction is 60-65%.  Global right ventricular function is normal.  Mild aortic insufficiency is present.  The right ventricular systolic pressure is 15mmHg above the right atrial  pressure.  There is no prior study for direct comparison.  ______________________________________________________________________________  Left Ventricle  Left ventricular size is normal. Left ventricular wall thickness is normal.  Left ventricular function is normal.The ejection fraction is 60-65%.  Thickening of the anterobasal septum is present. Left ventricular diastolic  function is normal. No regional wall motion abnormalities are seen.     Right Ventricle  The right ventricle is normal size.  Global right ventricular function is  normal.     Atria  Both atria appear normal. The atrial septum is intact as assessed by color  Doppler .     Mitral Valve  Trace mitral insufficiency is present.     Aortic Valve  Trileaflet aortic sclerosis without stenosis. Mild aortic insufficiency is  present.     Tricuspid Valve  Mild tricuspid insufficiency is present. The right ventricular systolic  pressure is 15mmHg above the right atrial pressure.     Pulmonic Valve  Trace pulmonic insufficiency is present.     Vessels  The inferior vena cava was normal in size with preserved respiratory  variability. Severely dilated ascending aorta when indexed to patient's body  surface area measuring 4.1 cm (3.1 cm/m2.). Aortic root and ascending  dilatation is present. Sinuses of Valsalva 2.8 cm. Ascending aorta 4.1 cm.     Pericardium  No pericardial effusion is present.     Compared to Previous Study  There is no prior study for direct comparison.       ______________________________________________________________________________  MMode/2D Measurements & Calculations  IVSd: 0.90 cm  LVIDd: 3.9 cm  LVIDs: 2.6 cm  LVPWd: 1.1 cm  FS: 33.8 %  LV mass(C)d: 122.2 grams  LV mass(C)dI: 91.7 grams/m2  Ao root diam: 2.8 cm  asc Aorta Diam: 4.1 cm  LVOT diam: 1.9 cm  LVOT area: 2.9 cm2     EF(MOD-bp): 66.1 %  Ao root diam index Ht(cm/m): 2.0  Ao root diam index BSA (cm/m2): 2.1  Asc Ao diam index BSA (cm/m2): 3.1  Asc Ao diam index Ht(cm/m): 3.0  LA Volume (BP): 21.9 ml     LA Volume Index (BP): 16.5 ml/m2  RWT: 0.56  TAPSE: 1.6 cm     Doppler Measurements & Calculations  MV E max pedro luis: 100.0 cm/sec  MV A max pedro luis: 90.7 cm/sec  MV E/A: 1.1  MV dec time: 0.20 sec  Ao V2 max: 160.0 cm/sec  Ao max PG: 10.2 mmHg  Ao V2 mean: 106.0 cm/sec  Ao mean P.0 mmHg  Ao V2 VTI: 38.1 cm  AUNG(I,D): 2.1 cm2  AUNG(V,D): 2.3 cm2  AI P1/2t: 674.1 msec  LV V1 max P.2 mmHg  LV V1 max: 124.0 cm/sec  LV V1 VTI: 26.7 cm     SV(LVOT): 78.3 ml  SI(LVOT): 58.8  ml/m2  TR max jose: 191.4 cm/sec  TR max P.6 mmHg  AV Jose Ratio (DI): 0.78  AUNG Index (cm2/m2): 1.5  E/E' av.7  Lateral E/e': 10.8  Medial E/e': 12.6  RV S Jose: 7.8 cm/sec       CT chest    CTA: Patent aorta. Normal aortic branching pattern.  Right nnominate, subclavian, and carotid, left carotid, and left subclavian  arteries patent. Right vertebral artery patent. Thin left vertebral  artery.     Aortic measurements:       Sinuses of Valsalva: 33 mm       Sinotubular junction: 30 mm       Ascendin mm       Arch: 30 mm       Proximal descendin mm       Mid descendin mm       Diaphragm: 23 mm     Celiac artery patent. Early right hepatic artery origin from the  celiac artery. Accessory artery connects the left hepatic and left  gastric arteries through the fissure for ligamentum venosum.     CT: No suspicious pulmonary nodule.     Subcentimeter thyroid nodules.     Mild spine degenerative changes. T10 sclerotic focus thought to be a  bone island.                                                                   IMPRESSION:  1. Ascending aorta measures 43 mm in diameter.     2. Subcentimeter thyroid nodules.     Adenosine Lexiscan       The nuclear stress test is negative for inducible myocardial ischemia or infarction.     Hyperdynamic LV function with LV EF over 75%.     There is no prior study for comparison.        ECG Summary    ECG Baseline electrocardiogram demonstrates sinus rhythm.   The stress electrocardiogram is negative for inducible ischemic EKG changes.   There were no arrhythmias during stress.  There were no arrhythmias during recovery.       Assessment and Plan:     We discussed the results with patient.  We discussed the importance of a heart healthy diet and lifestyle and also a diabetes diet.    We discussed following items:    Medication Changes: None  Patient is taking atorvastatin 20 mg/d = LDL-chol is 122 ( a new fasting is recommended)     We discussed to look for a  cholesterol-lowering diet  Lpa is severely elevated- we discussed that this lipid is not influenced by diet but it is genetically. - it is a risk for atherosclerosis and calcification of the aortic valve.     Follow Up:   MRI of the aorta  Follow up based on results       Follow the American Heart Association Diet and Lifestyle Recommendations:  -Limit saturated fat, trans fat, sodium, red meat, sweets and sugar-sweetened beverages. If you choose to eat red meat, compare labels and select the leanest cuts available.    Adnreas Leyva MD, PhD  Professor of Medicine  Division of Cardiology       CC  Patient Care Team:  Jareth Gonzalez MD as PCP - [facility] HospitalHoward MD as MD (Cardiovascular Disease)  Andreas Leyva MD as MD (Cardiovascular Disease)  Lisa Villa, JOSE ALEJANDRO CNP as Assigned Heart and Vascular Provider  LISA VILLA      Please do not hesitate to contact me if you have any questions/concerns.     Sincerely,     Andreas Leyva MD

## 2024-11-21 NOTE — PROGRESS NOTES
HPI: I had the privilege to  evaluate Mrs Alina Karimi, who is a 64 yr female who comes to be evaluated for a large aortic arch, which was seen on a CT Chest.  Patient denies chest pain, shortness of breath, palpations and intermittent claudication,    PAST MEDICAL HISTORY:    PSVT s/p ablation .     CURRENT MEDICATIONS:  Current Outpatient Medications   Medication Sig Dispense Refill    aspirin 81 MG EC tablet Take 81 mg by mouth daily      atorvastatin (LIPITOR) 20 MG tablet Take 20 mg by mouth daily      isoniazid (NYDRAZID) 300 MG tablet TAKE ONE Tablet (300mg) BY MOUTH EVERY DAY      Omega-3 Fatty Acids (FISH OIL) 1200 MG capsule Take 1,200 mg by mouth daily      rifampin (RIFADIN) 300 MG capsule TAKE TWO CAPSULES (600mg) BY MOUTH EVERY DAY         PAST SURGICAL HISTORY:  No past surgical history on file.    ALLERGIES   No Known Allergies    FAMILY HISTORY:  Mother  - cause unknown  Father: CAD -   Brother  due to CAD      SOCIAL HISTORY:  Social History     Socioeconomic History    Marital status:      Spouse name: None    Number of children: None    Years of education: None    Highest education level: None   Tobacco Use    Smoking status: Never    Smokeless tobacco: Never       ROS:   Constitutional: No fever, chills, or sweats. No weight gain/loss   ENT: No visual disturbance, ear ache, epistaxis, sore throat  Allergies/Immunologic: Negative.   Respiratory: No cough, hemoptysia  Cardiovascular: As per HPI  GI: No nausea, vomiting, hematemesis, melena, or hematochezia  : No urinary frequency, dysuria, or hematuria  Integument: Negative  Psychiatric: Negative  Neuro: Negative  Endocrinology: Negative   Musculoskeletal: Negative    EXAM:  BP (!) 145/84 (BP Location: Right arm, Patient Position: Chair, Cuff Size: Adult Regular)   Pulse 50   Wt 46.3 kg (102 lb)   SpO2 99%   In general, the patient is a pleasant female in no apparent distress.    HEENT: NC/AT.  GILMA.  MARLEY.   Sclerae white, not injected.  Nares clear.  Pharynx without erythema or exudate.  Dentition intact.    Neck: No adenopathy.  No thyromegaly. Carotids +4/4 bilaterally without bruits.  No jugular venous distension.   Heart: RRR. Normal S1, S2 splits physiologically. No murmur, rub, click, or gallop. The PMI is in the 5th ICS in the midclavicular line. There is no heave.    Lungs: CTA.  No ronchi, wheezes, rales.  No dullness to percussion.   Abdomen: Soft, nontender, nondistended. No organomegaly.  No bruits.   Extremities: No clubbing, cyanosis, or edema.  The pulses are +4/4 at the radial, brachial, femoral, popliteal, DP, and PT sites bilaterally.  No bruits are noted.  Neurologic: Alert and oriented to person/place/time, normal speech, gait and affect  Skin: No petechiae, purpura or rash    BP at the end of the visit:. 128/74 mmHg    Labs:     Latest Reference Range & Units 11/21/24 14:22   Sodium 135 - 145 mmol/L 143   Potassium 3.4 - 5.3 mmol/L 4.2   Chloride 98 - 107 mmol/L 107   Carbon Dioxide (CO2) 22 - 29 mmol/L 28   Urea Nitrogen 8.0 - 23.0 mg/dL 12.2   Creatinine 0.51 - 0.95 mg/dL 0.56   GFR Estimate >60 mL/min/1.73m2 >90   Calcium 8.8 - 10.4 mg/dL 9.2   Anion Gap 7 - 15 mmol/L 8   Albumin 3.5 - 5.2 g/dL 4.5   Protein Total 6.4 - 8.3 g/dL 7.5   Alkaline Phosphatase 40 - 150 U/L 82   ALT 0 - 50 U/L 19   AST 0 - 45 U/L 23   Bilirubin Total <=1.2 mg/dL 0.3   Glucose 70 - 99 mg/dL 82   Estimated Average Glucose <117 mg/dL 157 (H)   Hemoglobin A1C <5.7 % 7.1 (H)   Lipoprotein (a) <30 mg/dL >240 (H)   (H): Data is abnormally high   Latest Reference Range & Units 11/21/24 14:54   Albumin Urine mg/g Cr  See Comment   Albumin Urine mg/L mg/L <12.0   Creatinine Urine mg/dL 16.0       LIPID RESULTS:  Lab Results   Component Value Date    CHOL 193 08/07/2024    HDL 51 08/07/2024     (H) 08/07/2024    TRIG 102 08/07/2024    NHDL 142 (H) 08/07/2024       LIVER ENZYME RESULTS:  Lab Results   Component Value Date     AST 23 11/21/2024    ALT 19 11/21/2024       CBC RESULTS:  Lab Results   Component Value Date    WBC 5.0 08/07/2024    RBC 4.16 08/07/2024    HGB 13.0 08/07/2024    HCT 39.2 08/07/2024    MCV 94 08/07/2024    MCH 31.3 08/07/2024    MCHC 33.2 08/07/2024    RDW 13.1 08/07/2024     08/07/2024       BMP RESULTS:  Lab Results   Component Value Date     11/21/2024    POTASSIUM 4.2 11/21/2024    CHLORIDE 107 11/21/2024    CO2 28 11/21/2024    ANIONGAP 8 11/21/2024    GLC 82 11/21/2024    BUN 12.2 11/21/2024    CR 0.56 11/21/2024    GFRESTIMATED >90 11/21/2024    ABDOULAYE 9.2 11/21/2024        Lpa > 240 mg/dl    Procedures:    EKG: sin rhythm    Echocardiogram  Severely dilated ascending aorta when indexed to patient's body surface area  measuring 4.1 cm (3.1 cm/m2.) Recommend CTA or MRA of the aorta to better  assess aortic dimensions.     Left ventricular function is normal.The ejection fraction is 60-65%.  Global right ventricular function is normal.  Mild aortic insufficiency is present.  The right ventricular systolic pressure is 15mmHg above the right atrial  pressure.  There is no prior study for direct comparison.  ______________________________________________________________________________  Left Ventricle  Left ventricular size is normal. Left ventricular wall thickness is normal.  Left ventricular function is normal.The ejection fraction is 60-65%.  Thickening of the anterobasal septum is present. Left ventricular diastolic  function is normal. No regional wall motion abnormalities are seen.     Right Ventricle  The right ventricle is normal size. Global right ventricular function is  normal.     Atria  Both atria appear normal. The atrial septum is intact as assessed by color  Doppler .     Mitral Valve  Trace mitral insufficiency is present.     Aortic Valve  Trileaflet aortic sclerosis without stenosis. Mild aortic insufficiency is  present.     Tricuspid Valve  Mild tricuspid insufficiency is  present. The right ventricular systolic  pressure is 15mmHg above the right atrial pressure.     Pulmonic Valve  Trace pulmonic insufficiency is present.     Vessels  The inferior vena cava was normal in size with preserved respiratory  variability. Severely dilated ascending aorta when indexed to patient's body  surface area measuring 4.1 cm (3.1 cm/m2.). Aortic root and ascending  dilatation is present. Sinuses of Valsalva 2.8 cm. Ascending aorta 4.1 cm.     Pericardium  No pericardial effusion is present.     Compared to Previous Study  There is no prior study for direct comparison.       ______________________________________________________________________________  MMode/2D Measurements & Calculations  IVSd: 0.90 cm  LVIDd: 3.9 cm  LVIDs: 2.6 cm  LVPWd: 1.1 cm  FS: 33.8 %  LV mass(C)d: 122.2 grams  LV mass(C)dI: 91.7 grams/m2  Ao root diam: 2.8 cm  asc Aorta Diam: 4.1 cm  LVOT diam: 1.9 cm  LVOT area: 2.9 cm2     EF(MOD-bp): 66.1 %  Ao root diam index Ht(cm/m): 2.0  Ao root diam index BSA (cm/m2): 2.1  Asc Ao diam index BSA (cm/m2): 3.1  Asc Ao diam index Ht(cm/m): 3.0  LA Volume (BP): 21.9 ml     LA Volume Index (BP): 16.5 ml/m2  RWT: 0.56  TAPSE: 1.6 cm     Doppler Measurements & Calculations  MV E max jose: 100.0 cm/sec  MV A max jose: 90.7 cm/sec  MV E/A: 1.1  MV dec time: 0.20 sec  Ao V2 max: 160.0 cm/sec  Ao max PG: 10.2 mmHg  Ao V2 mean: 106.0 cm/sec  Ao mean P.0 mmHg  Ao V2 VTI: 38.1 cm  AUNG(I,D): 2.1 cm2  AUNG(V,D): 2.3 cm2  AI P1/2t: 674.1 msec  LV V1 max P.2 mmHg  LV V1 max: 124.0 cm/sec  LV V1 VTI: 26.7 cm     SV(LVOT): 78.3 ml  SI(LVOT): 58.8 ml/m2  TR max jose: 191.4 cm/sec  TR max P.6 mmHg  AV Jose Ratio (DI): 0.78  AUNG Index (cm2/m2): 1.5  E/E' av.7  Lateral E/e': 10.8  Medial E/e': 12.6  RV S Jose: 7.8 cm/sec       CT chest    CTA: Patent aorta. Normal aortic branching pattern.  Right nnominate, subclavian, and carotid, left carotid, and left subclavian  arteries patent. Right  vertebral artery patent. Thin left vertebral  artery.     Aortic measurements:       Sinuses of Valsalva: 33 mm       Sinotubular junction: 30 mm       Ascendin mm       Arch: 30 mm       Proximal descendin mm       Mid descendin mm       Diaphragm: 23 mm     Celiac artery patent. Early right hepatic artery origin from the  celiac artery. Accessory artery connects the left hepatic and left  gastric arteries through the fissure for ligamentum venosum.     CT: No suspicious pulmonary nodule.     Subcentimeter thyroid nodules.     Mild spine degenerative changes. T10 sclerotic focus thought to be a  bone island.                                                                   IMPRESSION:  1. Ascending aorta measures 43 mm in diameter.     2. Subcentimeter thyroid nodules.     Adenosine Lexiscan      The nuclear stress test is negative for inducible myocardial ischemia or infarction.    Hyperdynamic LV function with LV EF over 75%.    There is no prior study for comparison.        ECG Summary    ECG Baseline electrocardiogram demonstrates sinus rhythm.   The stress electrocardiogram is negative for inducible ischemic EKG changes.   There were no arrhythmias during stress.  There were no arrhythmias during recovery.       Assessment and Plan:     We discussed the results with patient.  We discussed the importance of a heart healthy diet and lifestyle and also a diabetes diet.    We discussed following items:    Medication Changes: None  Patient is taking atorvastatin 20 mg/d = LDL-chol is 122 ( a new fasting is recommended)     We discussed to look for a cholesterol-lowering diet  Lpa is severely elevated- we discussed that this lipid is not influenced by diet but it is genetically. - it is a risk for atherosclerosis and calcification of the aortic valve.     Follow Up:   MRI of the aorta  Follow up based on results       Follow the American Heart Association Diet and Lifestyle Recommendations:  -Limit  saturated fat, trans fat, sodium, red meat, sweets and sugar-sweetened beverages. If you choose to eat red meat, compare labels and select the leanest cuts available.    Andreas Leyva MD, PhD  Professor of Medicine  Division of Cardiology       CC  Patient Care Team:  Jareth Gonzalez MD as PCP - [facility], Howard Payton MD as MD (Cardiovascular Disease)  Andreas Leyva MD as MD (Cardiovascular Disease)  Lisa Lee, JOSE ALEJANDRO CNP as Assigned Heart and Vascular Provider  LISA LEE

## 2024-11-21 NOTE — PATIENT INSTRUCTIONS
November 21, 2024    Cardiology Provider You Saw During Your Visit: Dr. Leyva      Medication Changes: None      Follow Up:   MRI of the aorta  Follow up based on results        Follow the American Heart Association Diet and Lifestyle Recommendations:  -Limit saturated fat, trans fat, sodium, red meat, sweets and sugar-sweetened beverages. If you choose to eat red meat, compare labels and select the leanest cuts available.  -Aim for at least 150 minutes of moderate physical activity or 75 minutes of vigorous physical activity - or an equal combination of both - each week.      To Reach Us:  -During business hours: [phone], press option # 1 to schedule an appointment or to leave a message for your care team.     -After hours, weekends or holidays: [phone], press option #4 and ask to speak to the on-call cardiologist. Inform them you are a patient at the [facility].        **If you have a cardiac device, please make sure you schedule an in-person device check just prior to your cardiology provider appointments**        Swetha Bella RN  Cardiology Care Coordinator - General Cardiology  [facility]

## 2024-11-26 ENCOUNTER — APPOINTMENT (OUTPATIENT)
Dept: INTERPRETER SERVICES | Facility: CLINIC | Age: 64
End: 2024-11-26
Payer: MEDICARE

## 2024-11-26 ENCOUNTER — TELEPHONE (OUTPATIENT)
Dept: CARDIOLOGY | Facility: CLINIC | Age: 64
End: 2024-11-26
Payer: MEDICARE

## 2024-11-26 NOTE — TELEPHONE ENCOUNTER
----- Message from Seda MAGAÑA sent at 11/26/2024  4:23 PM CST -----  Regarding: RE: Return EP w/ Lisa Chew Nicholas 3/19/2025  Hi,    She doesn't need EP at this time. Dr Esquivel got the ball rolling with ordering tests, but now she is seeing Dr Leyva for prevention and is being evaluated with the MRA. So would strongly suggest she follows Dr Leyva's recommendations at this time. Please cancel the  appointment.    Thanks,    ----- Message -----  From: Olive Harrison  Sent: 11/22/2024  11:43 AM CST  To: Swetha Bella, LAISHA; Jaida Crockett RN; #  Subject: Return EP w/ Lisa Waddell 3/19/2025             Hi,    Called and scheduled MRA 1/14/2025. While I was on the phone w/ pt's daughter she mentioned that she wanted to follow-up w/ Dr. Esquivel again. I didn't see any instructions for scheduling w/ EP going forward so I told her the plan may depend on the MRA, but if she wanted to take next available w/ EP for now that would probably be ok. Dr. Esquivel did not have a schedule so scheduled next available w/  and added pt to waitlist w/ Dr. Esquivel. I offered to schedule Return Preventative w/ Dr. Leyva next available since he does book out further and pt's daughter said for now they'd rather follow-up w/ Dr. Esquivel. Let me know if there's anything else we can do for this pt in the meantime.    Thank you,    Olive Harrison

## 2024-11-26 NOTE — TELEPHONE ENCOUNTER
11/26/2024 5:27PM Olive Harrison  Left Voicemail (1st Attempt) and Sent Mychart (1st Attempt) for the patient to call back and cancel the following:    Appointment type: Return EP  Provider: Lisa Waddell  Return date: 3/19/2025  Specialty phone number: [phone] option 1  Additional appointment(s) needed: NA  Additonal Notes: 11/26 LVM and MYC for pt to cancel Return EP w/ Lisa Waddell. Dr. Leyva wants pt to complete MRA Chest and we will schedule follow-up based on results. JELANI Harrison 11/26/2024 5:27PM

## 2024-12-06 ENCOUNTER — TRANSFERRED RECORDS (OUTPATIENT)
Dept: HEALTH INFORMATION MANAGEMENT | Facility: CLINIC | Age: 64
End: 2024-12-06

## 2025-01-07 ENCOUNTER — MEDICAL CORRESPONDENCE (OUTPATIENT)
Dept: HEALTH INFORMATION MANAGEMENT | Facility: CLINIC | Age: 65
End: 2025-01-07
Payer: MEDICARE

## 2025-01-07 ENCOUNTER — TRANSFERRED RECORDS (OUTPATIENT)
Dept: HEALTH INFORMATION MANAGEMENT | Facility: CLINIC | Age: 65
End: 2025-01-07

## 2025-01-14 ENCOUNTER — HOSPITAL ENCOUNTER (OUTPATIENT)
Dept: MRI IMAGING | Facility: CLINIC | Age: 65
Discharge: HOME | End: 2025-01-14
Attending: INTERNAL MEDICINE
Payer: MEDICARE

## 2025-01-14 ENCOUNTER — MYC MEDICAL ADVICE (OUTPATIENT)
Dept: CARDIOLOGY | Facility: CLINIC | Age: 65
End: 2025-01-14

## 2025-01-14 DIAGNOSIS — I77.810 ASCENDING AORTA DILATION: ICD-10-CM

## 2025-01-14 PROCEDURE — 255N000002 HC RX 255 OP 636: Performed by: INTERNAL MEDICINE

## 2025-01-14 PROCEDURE — 71555 MRI ANGIO CHEST W OR W/O DYE: CPT

## 2025-01-14 PROCEDURE — A9585 GADOBUTROL INJECTION: HCPCS | Performed by: INTERNAL MEDICINE

## 2025-01-14 PROCEDURE — 71555 MRI ANGIO CHEST W OR W/O DYE: CPT | Mod: 26 | Performed by: STUDENT IN AN ORGANIZED HEALTH CARE EDUCATION/TRAINING PROGRAM

## 2025-01-14 RX ORDER — GADOBUTROL 604.72 MG/ML
7.5 INJECTION INTRAVENOUS ONCE
Status: COMPLETED | OUTPATIENT
Start: 2025-01-14 | End: 2025-01-14

## 2025-01-14 RX ADMIN — GADOBUTROL 7.5 ML: 604.72 INJECTION INTRAVENOUS at 08:30

## 2025-01-16 ENCOUNTER — VIRTUAL VISIT (OUTPATIENT)
Dept: CARDIOLOGY | Facility: CLINIC | Age: 65
End: 2025-01-16
Attending: INTERNAL MEDICINE
Payer: MEDICARE

## 2025-01-16 DIAGNOSIS — E78.49 OTHER HYPERLIPIDEMIA: Primary | ICD-10-CM

## 2025-01-16 DIAGNOSIS — E11.9 TYPE 2 DIABETES, HBA1C GOAL < 7% (H): ICD-10-CM

## 2025-01-16 DIAGNOSIS — I77.810 MILD ASCENDING AORTA DILATATION: ICD-10-CM

## 2025-01-16 DIAGNOSIS — E78.41 ELEVATED LIPOPROTEIN(A): ICD-10-CM

## 2025-01-16 NOTE — CONFIDENTIAL NOTE
Telephone visit;    Time    Start: 12.15 pm  Stop: 12.45 pm    Location:  Patient at home  Dr Leyva: [facility]            HPI: I had the privilege to  evaluate Mrs Alina Karimi, who is a 64 yr female with mixed dyslipidemia, type 2 diabetes, enlarge aorta ascending,  during a phone visit assisted by her daughter to discuss the results of her MRI of the heart, which was ordered because a large aortic arch was seen on a CT chest.    Patient denies chest pain, shortness of breath, palpations and intermittent claudication,    PAST MEDICAL HISTORY:    [facility]    CURRENT MEDICATIONS:  Current Outpatient Medications   Medication Sig Dispense Refill    aspirin 81 MG EC tablet Take 81 mg by mouth daily      atorvastatin (LIPITOR) 20 MG tablet Take 20 mg by mouth daily      isoniazid (NYDRAZID) 300 MG tablet TAKE ONE Tablet (300mg) BY MOUTH EVERY DAY      Omega-3 Fatty Acids (FISH OIL) 1200 MG capsule Take 1,200 mg by mouth daily      rifampin (RIFADIN) 300 MG capsule TAKE TWO CAPSULES (600mg) BY MOUTH EVERY DAY         PAST SURGICAL HISTORY:  None    ALLERGIES   No Known Allergies    FAMILY HISTORY:  CVD    SOCIAL HISTORY:  Social History     Socioeconomic History    Marital status:    Tobacco Use    Smoking status: Never    Smokeless tobacco: Never       ROS:   Constitutional: No fever, chills, or sweats. No weight gain/loss   ENT: No visual disturbance, ear ache, epistaxis, sore throat  Allergies/Immunologic: Negative.   Respiratory: No cough, hemoptysia  Cardiovascular: As per HPI  GI: No nausea, vomiting, hematemesis, melena, or hematochezia  : No urinary frequency, dysuria, or hematuria  Integument: Negative  Psychiatric: Negative  Neuro: Negative  Endocrinology: Negative   Musculoskeletal: Negative    EXAM:  None - this was a phone visit.      Labs:  LIPID RESULTS:  Lab Results   Component Value Date    CHOL 193 08/07/2024    HDL 51 08/07/2024     (H) 08/07/2024    TRIG 102 08/07/2024    NHDL 142 (H) 08/07/2024        LIVER ENZYME RESULTS:  Lab Results   Component Value Date    AST 23 11/21/2024    ALT 19 11/21/2024       CBC RESULTS:  Lab Results   Component Value Date    WBC 5.0 08/07/2024    RBC 4.16 08/07/2024    HGB 13.0 08/07/2024    HCT 39.2 08/07/2024    MCV 94 08/07/2024    MCH 31.3 08/07/2024    MCHC 33.2 08/07/2024    RDW 13.1 08/07/2024     08/07/2024       BMP RESULTS:  Lab Results   Component Value Date     11/21/2024    POTASSIUM 4.2 11/21/2024    CHLORIDE 107 11/21/2024    CO2 28 11/21/2024    ANIONGAP 8 11/21/2024    GLC 82 11/21/2024    BUN 12.2 11/21/2024    CR 0.56 11/21/2024    GFRESTIMATED >90 11/21/2024    ABDOULAYE 9.2 11/21/2024        A1C RESULTS:  Lab Results   Component Value Date    A1C 7.1 (H) 11/21/2024         Procedures:    MRI heart 01-14-25    1. There is mild-moderate dilation of the proximal ascending aorta (4.3 x 3.9 cm,  indexed value 3.23  cm/m2).     2. The aortic root, ascending aorta, transverse arch and descending thoracic aorta are normal in size  without an aneurysm or dissection.     3. The aortic arch is left sided. There is normal branching of the arch vessels. There is no coarctation.     4. The main and proximal branch pulmonary arteries are normal in size.      5. The systemic venous connections are normal.         CONCLUSIONS: There is mild-moderate dilation at the level of the proximal ascending aorta (4.3 x 3.9 cm,  indexed value 3.23 cm/m2).     VASCULAR   ==========================================================================================================     UPPER VASCULAR   ----------------------------------------------------------------------------------------     EVALUATION OF THE THORACIC AORTA  ================================       COMMENTS        On today's examination, the following measurements were obtained:      Sinuses of Valsalva: 3.2 x 2.9 cm    Sinotubular junction: 2.9 x 2.7 cm    Ascending aorta: 4.3 x 3.9 cm    Aortic arch between the  origins of the innominate and left common carotid a: 3.0 x 2.8 cm    Aortic arch distal to the left subclavian a: 3.0 x 2.6 cm    Mid descending aorta: 2.8 x 2.8 cm    Distal descending aorta: 2.3 x 2.1 cm     Main pulmonary artery: 2.4 x 2.1 cm    Proximal left branch pulmonary artery: 1.6 x 1.5 cm    Proximal right branch pulmonary artery: 2.1 x 1.7 cm      Assessment and Plan:     I discussed the results with patient.  I discussed the importance of a heart healthy diet, diabetes diet.  I discussed the effect of Lpa on the arteries (atherosclerosis) and potential calcification of the aortic valve.  Follow-up within 1 year and MRI heart.    Andreas Leyva MD, PhD  Professor of Medicine  Division of Cardiology             CC  Patient Care Team:  Jareth Gonzalez [facility]CP [facility]  Andreas Leyva MD as MD (Cardiovascular Disease)  Andreas Leyva MD as Assigned Heart and Vascular Provider  Swetha Bella RN as Specialty Care Coordinator (Cardiology)  ROSALEE VILLA

## 2025-01-16 NOTE — PATIENT INSTRUCTIONS
Follow up in one year : Cardiac MRI, then see Dr. Leyva in clinic afterwards to discuss results.  You will get a scheduling reminder in advance.

## 2025-01-16 NOTE — LETTER
1/16/2025      RE: Alina Karimi  [address] [place] [address]       Dear Colleague,    Thank you for the opportunity to participate in the care of your patient, Alina Karimi, at the [facility]. Please see a copy of my visit note below.    No notes on file    Please do not hesitate to contact me if you have any questions/concerns.     Sincerely,     Andreas Leyva MD

## 2025-01-21 ENCOUNTER — TRANSCRIBE ORDERS (OUTPATIENT)
Dept: OTHER | Age: 65
End: 2025-01-21

## 2025-01-21 DIAGNOSIS — R20.0 NUMBNESS ON LEFT SIDE: Primary | ICD-10-CM

## 2025-02-26 ENCOUNTER — TRANSFERRED RECORDS (OUTPATIENT)
Dept: HEALTH INFORMATION MANAGEMENT | Facility: CLINIC | Age: 65
End: 2025-02-26

## 2025-03-02 ENCOUNTER — HEALTH MAINTENANCE LETTER (OUTPATIENT)
Age: 65
End: 2025-03-02

## 2025-03-25 NOTE — CONFIDENTIAL NOTE
NEUROLOGY - PRE VISIT PLANNING             Referring Provider Reason for Referral Office Visit Notes   Anna Chau MD   [facility]  Numbness on left side  N/A - Requested        IMAGING/NOTES/SCANS Status/ Location  DATE   MRI/MRA(HEAD, NECK, SPINE) Internal - [facility] 01/14/2025    CT/CTA   Internal  10/19/2024    EMG N/A    EEG N/A    LABS Internal, External    Additional Notes N/A    Additional Testing N/A

## 2025-05-21 ENCOUNTER — PRE VISIT (OUTPATIENT)
Dept: NEUROLOGY | Facility: CLINIC | Age: 65
End: 2025-05-21

## 2025-06-21 ENCOUNTER — HEALTH MAINTENANCE LETTER (OUTPATIENT)
Age: 65
End: 2025-06-21

## (undated) RX ORDER — REGADENOSON 0.08 MG/ML
INJECTION, SOLUTION INTRAVENOUS
Status: DISPENSED
Start: 2024-08-07